# Patient Record
Sex: FEMALE | URBAN - METROPOLITAN AREA
[De-identification: names, ages, dates, MRNs, and addresses within clinical notes are randomized per-mention and may not be internally consistent; named-entity substitution may affect disease eponyms.]

---

## 2018-03-15 ENCOUNTER — EMERGENCY (EMERGENCY)
Facility: HOSPITAL | Age: 33
LOS: 1 days | Discharge: HOME | End: 2018-03-15
Attending: EMERGENCY MEDICINE
Payer: MEDICAID

## 2018-03-15 VITALS
HEART RATE: 67 BPM | DIASTOLIC BLOOD PRESSURE: 76 MMHG | SYSTOLIC BLOOD PRESSURE: 124 MMHG | OXYGEN SATURATION: 100 % | RESPIRATION RATE: 16 BRPM | TEMPERATURE: 97 F

## 2018-03-15 VITALS
HEART RATE: 84 BPM | SYSTOLIC BLOOD PRESSURE: 142 MMHG | DIASTOLIC BLOOD PRESSURE: 87 MMHG | RESPIRATION RATE: 16 BRPM | OXYGEN SATURATION: 99 % | TEMPERATURE: 98 F

## 2018-03-15 LAB — HCG UR QL: NEGATIVE — SIGNIFICANT CHANGE UP

## 2018-03-15 PROCEDURE — 96374 THER/PROPH/DIAG INJ IV PUSH: CPT

## 2018-03-15 PROCEDURE — 70450 CT HEAD/BRAIN W/O DYE: CPT

## 2018-03-15 PROCEDURE — 70450 CT HEAD/BRAIN W/O DYE: CPT | Mod: 26

## 2018-03-15 PROCEDURE — 99284 EMERGENCY DEPT VISIT MOD MDM: CPT | Mod: 25

## 2018-03-15 PROCEDURE — 81025 URINE PREGNANCY TEST: CPT

## 2018-03-15 PROCEDURE — 96375 TX/PRO/DX INJ NEW DRUG ADDON: CPT

## 2018-03-15 RX ORDER — SODIUM CHLORIDE 9 MG/ML
1000 INJECTION INTRAMUSCULAR; INTRAVENOUS; SUBCUTANEOUS ONCE
Qty: 0 | Refills: 0 | Status: COMPLETED | OUTPATIENT
Start: 2018-03-15 | End: 2018-03-15

## 2018-03-15 RX ORDER — KETOROLAC TROMETHAMINE 30 MG/ML
30 SYRINGE (ML) INJECTION ONCE
Qty: 0 | Refills: 0 | Status: DISCONTINUED | OUTPATIENT
Start: 2018-03-15 | End: 2018-03-15

## 2018-03-15 RX ORDER — DIPHENHYDRAMINE HCL 50 MG
25 CAPSULE ORAL ONCE
Qty: 0 | Refills: 0 | Status: COMPLETED | OUTPATIENT
Start: 2018-03-15 | End: 2018-03-15

## 2018-03-15 RX ORDER — METOCLOPRAMIDE HCL 10 MG
10 TABLET ORAL ONCE
Qty: 0 | Refills: 0 | Status: COMPLETED | OUTPATIENT
Start: 2018-03-15 | End: 2018-03-15

## 2018-03-15 RX ADMIN — Medication 10 MILLIGRAM(S): at 20:10

## 2018-03-15 RX ADMIN — SODIUM CHLORIDE 1000 MILLILITER(S): 9 INJECTION INTRAMUSCULAR; INTRAVENOUS; SUBCUTANEOUS at 20:10

## 2018-03-15 RX ADMIN — Medication 25 MILLIGRAM(S): at 20:10

## 2018-03-15 RX ADMIN — Medication 30 MILLIGRAM(S): at 22:12

## 2018-03-15 NOTE — ED PROVIDER NOTE - OBJECTIVE STATEMENT
33 y/o F pt w/ PMHx of HTN and Migraines presents to ED c/o headache x2 weeks. Pt describes her headache as constant, located behind her eyes and associated w/ nausea, photophobia and phonophobia. Pt states that she went to her Neurologist and had normal EEG; pt was supposed to have CT but did not have time. Pt also notes she is awaiting an MRI by her neurologist. Pt denies numbness, tingling, focal weakness, cough, nasal congestion, or any other complaints. Pt is allergic to Cefixime (Unknown). 31 y/o F pt w/ PMHx of HTN and Migraines presents to ED c/o headache x2 weeks. Pt describes her headache as constant, located behind her eyes and associated w/ nausea, photophobia and phonophobia. Pt states that she went to her Neurologist and had normal EEG; pt was supposed to have MRI but did not have time to do it yet. Pt denies numbness, tingling, focal weakness, cough, nasal congestion, or any other complaints. Pt is allergic to Cefixime (Unknown).

## 2018-03-15 NOTE — ED PROVIDER NOTE - PROGRESS NOTE DETAILS
Feeling much better. Ct unremarkable. Already had follow up with neurologist. Will also schedule MRI as per her neuro instructions. Will dc with return instructions. Pt is well appearing walking with steady gait, stable for discharge and follow up without fail with medical doctor. I had a detailed discussion with the patient and/or guardian regarding the historical points, exam findings, and any diagnostic results supporting the discharge diagnosis. Pt educated on care and need for follow up. Strict return instructions and red flag signs and symptoms discussed with patient. Questions answered. Pt shows understanding of discharge information and agrees to follow.

## 2018-03-15 NOTE — ED PROVIDER NOTE - ATTENDING CONTRIBUTION TO CARE
limits, afebrile. No focal neuro deficit. Pt has never had a CT head before, waiting for an MRI by her neurologist. Will do CT head, meds, IVF, reassess.

## 2018-03-15 NOTE — ED PROVIDER NOTE - PHYSICAL EXAMINATION
Head is normocephalic and atraumatic. No head tenderness or skull depression on palpation. No temporal cord-like sensation, increased warmth or tenderness. Pt is alert and oriented x 3, able to follow commands. No facial asymmetry. Pupils 5 mm equally round with PERRL, no nystagmus, EOM intact. Cranial nerves III-XII grossly intact. Coordination nose-to-finger intact. All limbs equally strong bilaterally 5/5. No pronator drift. No numbness or tingling sensation.  No spinal/paraspinal tenderness. Neck is non-tender, supple with full range of motion. No nuchal rigidity.

## 2019-06-03 ENCOUNTER — TRANSCRIPTION ENCOUNTER (OUTPATIENT)
Age: 34
End: 2019-06-03

## 2020-07-26 NOTE — ED PROVIDER NOTE - CARDIAC, MLM
Normal rate, regular rhythm.  Heart sounds S1, S2.  No murmurs, rubs or gallops. Anemia, unspecified type Normal rate, regular rhythm.  Heart sounds S1, S2.

## 2020-10-04 ENCOUNTER — TRANSCRIPTION ENCOUNTER (OUTPATIENT)
Age: 35
End: 2020-10-04

## 2020-11-27 NOTE — ED ADULT NURSE NOTE - PRO INTERPRETER NEED 2
I assigned myself in error. I did not see this patient or participate in her care.     Electronically signed by Lindsay Cunningham MD on 11/27/2020 at 3:10 AM        Lindsay Cunningham MD  11/27/20 109 English

## 2021-09-28 NOTE — ED PROVIDER NOTE - SKIN, MLM
uncontrolled, continue on Lantus insulin 36 units at bedtime in addition to corrective insulin Lispro scale coverage before Q 6h, check glycohemoglobin level in am. Skin normal color for race, warm, dry and intact. No evidence of rash.

## 2021-10-08 NOTE — ED PROVIDER NOTE - MEDICAL DECISION MAKING DETAILS
33 y/o F pt presents w/ headache x2 weeks. Well appearing, vitals within normal limits, afebrile. No focal neuro deficit. Pt has never had a CT head before, waiting for an MRI by her neurologist. Will do CT head, meds, IVF, reassess. declines
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
